# Patient Record
Sex: MALE | Race: WHITE | NOT HISPANIC OR LATINO | Employment: UNEMPLOYED | URBAN - METROPOLITAN AREA
[De-identification: names, ages, dates, MRNs, and addresses within clinical notes are randomized per-mention and may not be internally consistent; named-entity substitution may affect disease eponyms.]

---

## 2023-10-17 ENCOUNTER — OFFICE VISIT (OUTPATIENT)
Dept: FAMILY MEDICINE CLINIC | Facility: CLINIC | Age: 58
End: 2023-10-17
Payer: MEDICAID

## 2023-10-17 VITALS
HEART RATE: 67 BPM | WEIGHT: 202 LBS | OXYGEN SATURATION: 97 % | BODY MASS INDEX: 29.92 KG/M2 | TEMPERATURE: 97.1 F | HEIGHT: 69 IN | SYSTOLIC BLOOD PRESSURE: 152 MMHG | DIASTOLIC BLOOD PRESSURE: 76 MMHG

## 2023-10-17 DIAGNOSIS — Z13.220 SCREENING FOR CHOLESTEROL LEVEL: ICD-10-CM

## 2023-10-17 DIAGNOSIS — I10 STAGE 1 HYPERTENSION: ICD-10-CM

## 2023-10-17 DIAGNOSIS — M54.41 CHRONIC LOW BACK PAIN WITH RIGHT-SIDED SCIATICA, UNSPECIFIED BACK PAIN LATERALITY: ICD-10-CM

## 2023-10-17 DIAGNOSIS — Z13.1 SCREENING FOR DIABETES MELLITUS: ICD-10-CM

## 2023-10-17 DIAGNOSIS — Z11.59 ENCOUNTER FOR HEPATITIS C SCREENING TEST FOR LOW RISK PATIENT: ICD-10-CM

## 2023-10-17 DIAGNOSIS — Z12.11 SCREENING FOR COLON CANCER: ICD-10-CM

## 2023-10-17 DIAGNOSIS — Z76.89 ENCOUNTER TO ESTABLISH CARE: Primary | ICD-10-CM

## 2023-10-17 DIAGNOSIS — Z12.5 SCREENING FOR PROSTATE CANCER: ICD-10-CM

## 2023-10-17 DIAGNOSIS — G89.29 CHRONIC LOW BACK PAIN WITH RIGHT-SIDED SCIATICA, UNSPECIFIED BACK PAIN LATERALITY: ICD-10-CM

## 2023-10-17 PROBLEM — K21.9 GERD (GASTROESOPHAGEAL REFLUX DISEASE): Status: ACTIVE | Noted: 2023-10-17

## 2023-10-17 PROBLEM — K21.9 GERD (GASTROESOPHAGEAL REFLUX DISEASE): Status: RESOLVED | Noted: 2023-10-17 | Resolved: 2023-10-17

## 2023-10-17 PROBLEM — F19.11 HISTORY OF SUBSTANCE ABUSE: Status: ACTIVE | Noted: 2023-10-17

## 2023-10-17 LAB
ALBUMIN SERPL-MCNC: 4.5 G/DL (ref 3.5–5.2)
ALBUMIN/GLOB SERPL: 1.9 G/DL
ALP SERPL-CCNC: 98 U/L (ref 39–117)
ALT SERPL W P-5'-P-CCNC: 33 U/L (ref 1–41)
ANION GAP SERPL CALCULATED.3IONS-SCNC: 8.5 MMOL/L (ref 5–15)
AST SERPL-CCNC: 28 U/L (ref 1–40)
BASOPHILS # BLD AUTO: 0.04 10*3/MM3 (ref 0–0.2)
BASOPHILS NFR BLD AUTO: 0.6 % (ref 0–1.5)
BILIRUB SERPL-MCNC: 0.4 MG/DL (ref 0–1.2)
BUN SERPL-MCNC: 15 MG/DL (ref 6–20)
BUN/CREAT SERPL: 14.7 (ref 7–25)
CALCIUM SPEC-SCNC: 9.7 MG/DL (ref 8.6–10.5)
CHLORIDE SERPL-SCNC: 103 MMOL/L (ref 98–107)
CHOLEST SERPL-MCNC: 198 MG/DL (ref 0–200)
CO2 SERPL-SCNC: 25.5 MMOL/L (ref 22–29)
CREAT SERPL-MCNC: 1.02 MG/DL (ref 0.76–1.27)
DEPRECATED RDW RBC AUTO: 41.5 FL (ref 37–54)
EGFRCR SERPLBLD CKD-EPI 2021: 85.7 ML/MIN/1.73
EOSINOPHIL # BLD AUTO: 0.22 10*3/MM3 (ref 0–0.4)
EOSINOPHIL NFR BLD AUTO: 3.3 % (ref 0.3–6.2)
ERYTHROCYTE [DISTWIDTH] IN BLOOD BY AUTOMATED COUNT: 12.5 % (ref 12.3–15.4)
GLOBULIN UR ELPH-MCNC: 2.4 GM/DL
GLUCOSE SERPL-MCNC: 157 MG/DL (ref 65–99)
HBA1C MFR BLD: 5.6 % (ref 4.8–5.6)
HCT VFR BLD AUTO: 44.3 % (ref 37.5–51)
HCV AB SER DONR QL: NORMAL
HDLC SERPL-MCNC: 64 MG/DL (ref 40–60)
HGB BLD-MCNC: 15.1 G/DL (ref 13–17.7)
IMM GRANULOCYTES # BLD AUTO: 0.02 10*3/MM3 (ref 0–0.05)
IMM GRANULOCYTES NFR BLD AUTO: 0.3 % (ref 0–0.5)
LDLC SERPL CALC-MCNC: 126 MG/DL (ref 0–100)
LDLC/HDLC SERPL: 1.97 {RATIO}
LYMPHOCYTES # BLD AUTO: 2.71 10*3/MM3 (ref 0.7–3.1)
LYMPHOCYTES NFR BLD AUTO: 40.4 % (ref 19.6–45.3)
MCH RBC QN AUTO: 31.1 PG (ref 26.6–33)
MCHC RBC AUTO-ENTMCNC: 34.1 G/DL (ref 31.5–35.7)
MCV RBC AUTO: 91.3 FL (ref 79–97)
MONOCYTES # BLD AUTO: 0.52 10*3/MM3 (ref 0.1–0.9)
MONOCYTES NFR BLD AUTO: 7.7 % (ref 5–12)
NEUTROPHILS NFR BLD AUTO: 3.2 10*3/MM3 (ref 1.7–7)
NEUTROPHILS NFR BLD AUTO: 47.7 % (ref 42.7–76)
NRBC BLD AUTO-RTO: 0.1 /100 WBC (ref 0–0.2)
PLATELET # BLD AUTO: 244 10*3/MM3 (ref 140–450)
PMV BLD AUTO: 10.5 FL (ref 6–12)
POTASSIUM SERPL-SCNC: 4 MMOL/L (ref 3.5–5.2)
PROT SERPL-MCNC: 6.9 G/DL (ref 6–8.5)
PSA SERPL-MCNC: 1.4 NG/ML (ref 0–4)
RBC # BLD AUTO: 4.85 10*6/MM3 (ref 4.14–5.8)
SODIUM SERPL-SCNC: 137 MMOL/L (ref 136–145)
TRIGL SERPL-MCNC: 40 MG/DL (ref 0–150)
VLDLC SERPL-MCNC: 8 MG/DL (ref 5–40)
WBC NRBC COR # BLD: 6.71 10*3/MM3 (ref 3.4–10.8)

## 2023-10-17 PROCEDURE — 83036 HEMOGLOBIN GLYCOSYLATED A1C: CPT | Performed by: FAMILY MEDICINE

## 2023-10-17 PROCEDURE — 85025 COMPLETE CBC W/AUTO DIFF WBC: CPT | Performed by: FAMILY MEDICINE

## 2023-10-17 PROCEDURE — 80061 LIPID PANEL: CPT | Performed by: FAMILY MEDICINE

## 2023-10-17 PROCEDURE — 80053 COMPREHEN METABOLIC PANEL: CPT | Performed by: FAMILY MEDICINE

## 2023-10-17 PROCEDURE — G0103 PSA SCREENING: HCPCS | Performed by: FAMILY MEDICINE

## 2023-10-17 PROCEDURE — 86803 HEPATITIS C AB TEST: CPT | Performed by: FAMILY MEDICINE

## 2023-10-17 RX ORDER — MULTIPLE VITAMINS W/ MINERALS TAB 9MG-400MCG
1 TAB ORAL DAILY
COMMUNITY

## 2023-10-17 RX ORDER — MELOXICAM 15 MG/1
15 TABLET ORAL DAILY
Qty: 14 TABLET | Refills: 0 | Status: SHIPPED | OUTPATIENT
Start: 2023-10-17

## 2023-10-17 NOTE — PROGRESS NOTES
"Chief Complaint    Establish Care (Wellness physical) and Back Pain (Low back pain for several years that radiates down RT leg.)    Subjective      Anant Garduno presents to [facility]    History of Present Illness    1.) ESTABLISH CARE : Patient admits to no primary care within the last 10 years.  Presents with a past medical history significant for now resolved GERD.  Patient also presents with a history of substance abuse-methamphetamine-5 months clean today.  Presents with primary complaint of low back pain with right-sided sciatica.  Reports that symptoms have been occurring for several years.  No previous significant treatment with pharmacological or physical therapy.  Patient reports an intermittent symptom with pain starting at the lateral posterior aspect of his right hip with radiation down into his posterior lateral lower extremity.    Objective     Vital Signs:     /76 (BP Location: Left arm, Patient Position: Sitting, Cuff Size: Adult)   Pulse 67   Temp 97.1 °F (36.2 °C) (Temporal)   Ht 175.3 cm (69\")   Wt 91.6 kg (202 lb)   SpO2 97%   BMI 29.83 kg/m²       Physical Exam  Vitals reviewed.   Constitutional:       General: He is not in acute distress.     Appearance: Normal appearance. He is well-developed.   HENT:      Head: Normocephalic and atraumatic.      Right Ear: Hearing and external ear normal.      Left Ear: Hearing and external ear normal.      Nose: Nose normal.   Eyes:      General: Lids are normal.         Right eye: No discharge.         Left eye: No discharge.      Conjunctiva/sclera: Conjunctivae normal.   Cardiovascular:      Rate and Rhythm: Normal rate and regular rhythm.   Pulmonary:      Effort: Pulmonary effort is normal.      Breath sounds: Normal breath sounds.   Abdominal:      General: There is no distension.   Musculoskeletal:         General: No swelling.      Cervical back: Neck supple.   Skin:     Coloration: Skin is not jaundiced. "      Findings: No erythema.   Neurological:      Mental Status: He is alert. Mental status is at baseline.   Psychiatric:         Mood and Affect: Mood and affect normal.         Thought Content: Thought content normal.       Assessment and Plan     Diagnoses and all orders for this visit:    1. Encounter to establish care (Primary)  Comments:  Labs as noted.  Additional recommendations per review.    2. Stage 1 hypertension  Comments:  We will continue to monitor discussed with patient during next visit here in office.  Orders:  -     CBC & Differential  -     Comprehensive Metabolic Panel    3. Screening for cholesterol level  -     Lipid Panel    4. Screening for diabetes mellitus  -     Hemoglobin A1c    5. Encounter for hepatitis C screening test for low risk patient  -     Hepatitis C Antibody    6. Screening for prostate cancer  -     PSA SCREENING    7. Chronic low back pain with right-sided sciatica, unspecified back pain laterality  Comments:  Trial of meloxicam.  We will reevaluate in 2 weeks.  If no relief, will recommend physical therapy.    8. Screening for colon cancer  Comments:  Shared decision to send patient handout regarding screening options.    Other orders  -     meloxicam (MOBIC) 15 MG tablet; Take 1 tablet by mouth Daily.  Dispense: 14 tablet; Refill: 0    Follow Up     Return in about 2 weeks (around 10/31/2023).    Patient was given instructions and counseling regarding his condition or for health maintenance advice. Please see specific information pulled into the AVS if appropriate.

## 2023-10-17 NOTE — PROGRESS NOTES
Venipuncture Blood Specimen Collection  Venipuncture performed in left arm by Gale Calhoun MA with good hemostasis. Patient tolerated the procedure well without complications.   10/17/23   Gale Calhoun MA

## 2023-10-18 PROBLEM — E78.00 ELEVATED LDL CHOLESTEROL LEVEL: Status: ACTIVE | Noted: 2023-10-18

## 2023-10-31 ENCOUNTER — OFFICE VISIT (OUTPATIENT)
Dept: FAMILY MEDICINE CLINIC | Facility: CLINIC | Age: 58
End: 2023-10-31
Payer: MEDICAID

## 2023-10-31 VITALS
BODY MASS INDEX: 30.21 KG/M2 | HEIGHT: 69 IN | HEART RATE: 55 BPM | DIASTOLIC BLOOD PRESSURE: 78 MMHG | TEMPERATURE: 96.6 F | WEIGHT: 204 LBS | OXYGEN SATURATION: 95 % | SYSTOLIC BLOOD PRESSURE: 146 MMHG

## 2023-10-31 DIAGNOSIS — E78.5 HYPERLIPIDEMIA, UNSPECIFIED HYPERLIPIDEMIA TYPE: Primary | ICD-10-CM

## 2023-10-31 DIAGNOSIS — M54.41 CHRONIC LOW BACK PAIN WITH RIGHT-SIDED SCIATICA, UNSPECIFIED BACK PAIN LATERALITY: ICD-10-CM

## 2023-10-31 DIAGNOSIS — I10 PRIMARY HYPERTENSION: ICD-10-CM

## 2023-10-31 DIAGNOSIS — G89.29 CHRONIC LOW BACK PAIN WITH RIGHT-SIDED SCIATICA, UNSPECIFIED BACK PAIN LATERALITY: ICD-10-CM

## 2023-10-31 PROCEDURE — 99214 OFFICE O/P EST MOD 30 MIN: CPT | Performed by: FAMILY MEDICINE

## 2023-10-31 PROCEDURE — 3077F SYST BP >= 140 MM HG: CPT | Performed by: FAMILY MEDICINE

## 2023-10-31 PROCEDURE — 3078F DIAST BP <80 MM HG: CPT | Performed by: FAMILY MEDICINE

## 2023-10-31 RX ORDER — ATORVASTATIN CALCIUM 20 MG/1
20 TABLET, FILM COATED ORAL NIGHTLY
Qty: 90 TABLET | Refills: 0 | Status: SHIPPED | OUTPATIENT
Start: 2023-10-31

## 2023-10-31 NOTE — PROGRESS NOTES
"Chief Complaint    Stage 1 hypertension (Follow-up) and Chronic low back pain with right-sided sciatica (Follow-up in trial of Meloxicam)    Subjective      Anant Garduno presents to [facility]    History of Present Illness    1.) PRIMARY HTN : Blood pressure as noted.  Patient is not currently prescribed any medication for hypertension.      2.) HLD : Most recent cholesterol panel significant for an LDL of 126.  ASCVD risk score calculated at 12.6%.    3.) CHRONIC LOW BACK PAIN : Stable.  During most recent visit here in office, patient was prescribed meloxicam to use as needed.  He notes recently picking up the medication and will plan on using as needed.  Admits to intermittent flares and not needing the medication on a daily basis.    Objective     Vital Signs:     /78 (BP Location: Left arm, Patient Position: Sitting, Cuff Size: Adult)   Pulse 55   Temp 96.6 °F (35.9 °C) (Temporal)   Ht 175.3 cm (69\")   Wt 92.5 kg (204 lb)   SpO2 95%   BMI 30.13 kg/m²       Physical Exam  Vitals reviewed.   Constitutional:       General: He is not in acute distress.     Appearance: Normal appearance. He is well-developed.   HENT:      Head: Normocephalic and atraumatic.      Right Ear: Hearing and external ear normal.      Left Ear: Hearing and external ear normal.      Nose: Nose normal.   Eyes:      General: Lids are normal.         Right eye: No discharge.         Left eye: No discharge.      Conjunctiva/sclera: Conjunctivae normal.   Pulmonary:      Effort: Pulmonary effort is normal.   Abdominal:      General: There is no distension.   Musculoskeletal:         General: No swelling.      Cervical back: Neck supple.   Skin:     Coloration: Skin is not jaundiced.      Findings: No erythema.   Neurological:      Mental Status: He is alert. Mental status is at baseline.   Psychiatric:         Mood and Affect: Mood and affect normal.         Thought Content: Thought content normal. "     Assessment and Plan     Diagnoses and all orders for this visit:    1. Hyperlipidemia, unspecified hyperlipidemia type (Primary)  Comments:  Shared decision to start atorvastatin as noted.  Advised to make adj to diet and exercise routine.  Expect to follow-up in 3 months - repeat lab at that time.    2. Primary hypertension  Comments:  Patient will log blood pressure at home and return for review and recommendations regarding starting medication.    3. Chronic low back pain with right-sided sciatica, unspecified back pain laterality  Comments:  Pt will use NSAID PRN - will call ofc with any concerning sxs.    Other orders  -     atorvastatin (LIPITOR) 20 MG tablet; Take 1 tablet by mouth Every Night.  Dispense: 90 tablet; Refill: 0    Follow Up : 3 mos    Patient was given instructions and counseling regarding his condition or for health maintenance advice. Please see specific information pulled into the AVS if appropriate.

## 2024-01-03 PROCEDURE — 87635 SARS-COV-2 COVID-19 AMP PRB: CPT

## 2024-10-02 ENCOUNTER — PATIENT OUTREACH (OUTPATIENT)
Dept: FAMILY MEDICINE CLINIC | Facility: CLINIC | Age: 59
End: 2024-10-02
Payer: MEDICAID